# Patient Record
Sex: MALE | Race: WHITE | NOT HISPANIC OR LATINO | Employment: FULL TIME | URBAN - METROPOLITAN AREA
[De-identification: names, ages, dates, MRNs, and addresses within clinical notes are randomized per-mention and may not be internally consistent; named-entity substitution may affect disease eponyms.]

---

## 2024-01-08 ENCOUNTER — TELEPHONE (OUTPATIENT)
Dept: GASTROENTEROLOGY | Facility: CLINIC | Age: 59
End: 2024-01-08
Payer: COMMERCIAL

## 2024-01-08 VITALS — HEIGHT: 70 IN | WEIGHT: 210 LBS | BODY MASS INDEX: 30.06 KG/M2

## 2024-01-08 DIAGNOSIS — Z12.11 SCREENING FOR COLON CANCER: ICD-10-CM

## 2024-01-08 DIAGNOSIS — Z80.0 FAMILY HISTORY OF COLON CANCER: ICD-10-CM

## 2024-01-08 DIAGNOSIS — Z86.010 HISTORY OF COLON POLYPS: Primary | ICD-10-CM

## 2024-01-08 RX ORDER — MONTELUKAST SODIUM 10 MG/1
10 TABLET ORAL
COMMUNITY
Start: 2023-11-13

## 2024-01-08 RX ORDER — LEVOTHYROXINE SODIUM 88 UG/1
88 TABLET ORAL
COMMUNITY
Start: 2023-11-06

## 2024-01-08 RX ORDER — AMLODIPINE BESYLATE 10 MG/1
10 TABLET ORAL
COMMUNITY
Start: 2023-12-15

## 2024-01-08 NOTE — TELEPHONE ENCOUNTER
----- Message from Lachelle Solo sent at 1/8/2024  1:29 PM CST -----  Contact: pt  Pt calling about letter for recheck for colonoscopy and he can be reached at [phone]     Thanks,

## 2024-01-08 NOTE — TELEPHONE ENCOUNTER
Patient's last colonoscopy was w/ TIFFANIE on 10/8/2018. Polyp (2 mm) in the transverse colon. (Polypectomy). Scattered stools throughout colon. Grade 1 internal hemorrhoids. Per [facility]'s report.    Patient denied having any current problems or issues. Denied any past surgeries besides the colonoscopy. Also denied having any hx of seizures, sleep apnea, or kidney disease. Fam hx of colon cancer in father. Chart was reviewed and updated with patient. Prep instructions were also reviewed and sent to patient's email at [email or web address].    Colonoscopy scheduled on Monday July 8, 2024 w/ FRANCIS at [facility]. - DMP

## 2024-01-12 RX ORDER — SOD SULF/POT CHLORIDE/MAG SULF 1.479 G
12 TABLET ORAL DAILY
Qty: 24 TABLET | Refills: 0 | Status: SHIPPED | OUTPATIENT
Start: 2024-01-12

## 2024-01-12 NOTE — TELEPHONE ENCOUNTER
Order signed.  Patient with eosinophilic esophagitis- diagnosed in 2007 by Dr. Whiting. If having any upper GI symptoms (trouble swallowing/reflux) would recommend eval for upper endoscopy.  KN

## 2024-07-02 ENCOUNTER — TELEPHONE (OUTPATIENT)
Dept: GASTROENTEROLOGY | Facility: CLINIC | Age: 59
End: 2024-07-02
Payer: COMMERCIAL

## 2024-07-02 VITALS — BODY MASS INDEX: 30.06 KG/M2 | HEIGHT: 70 IN | WEIGHT: 210 LBS

## 2024-07-02 DIAGNOSIS — Z12.11 SCREENING FOR COLON CANCER: ICD-10-CM

## 2024-07-02 DIAGNOSIS — Z86.010 HISTORY OF COLON POLYPS: Primary | ICD-10-CM

## 2024-07-02 DIAGNOSIS — Z80.0 FAMILY HISTORY OF COLON CANCER: ICD-10-CM

## 2024-07-02 NOTE — TELEPHONE ENCOUNTER
"[facility]  [address] [place] [address]  Phone: [phone]  Fax: [phone]    History & Physical         Provider: Dr. Juliann Terrell    Patient Name: Jose Angel HIGH (age):1965  58 y.o.           Gender: male   Phone: [phone]     Referring Physician: Kedar Sahu     Vital Signs:   Height - 5' 10"  Weight - 210 lb  BMI -  30.13    Plan: Colonoscopy @ [facility]    Encounter Diagnoses   Name Primary?    History of colon polyps Yes    Screening for colon cancer     Family history of colon cancer            History:      Past Medical History:   Diagnosis Date    BMI 30.13 2024    Eosinophilic esophagitis 2007    Hypertension     Personal history of colonic polyps     Thyroid disorder       Past Surgical History:   Procedure Laterality Date    COLONOSCOPY  10/08/2018      Medication List with Changes/Refills   Current Medications    AMLODIPINE (NORVASC) 10 MG TABLET    Take 10 mg by mouth.    LEVOTHYROXINE (SYNTHROID) 88 MCG TABLET    Take 88 mcg by mouth.    MONTELUKAST (SINGULAIR) 10 MG TABLET    Take 10 mg by mouth.    SOD SULF-POT CHLORIDE-MAG SULF (SUTAB) 1.479-0.188- 0.225 GRAM TABLET    Take 12 tablets by mouth once daily. Take according to package instructions with indicated amount of water. No breakfast day before test. May substitute with Suprep, Clenpiq, Plenvu, Moviprep or GoLytely based on Rx plan and patient preference.      Review of patient's allergies indicates:  No Known Allergies   Family History   Problem Relation Name Age of Onset    No Known Problems Mother      Colon cancer Father  60      Social History     Tobacco Use    Smoking status: Never    Smokeless tobacco: Never   Substance Use Topics    Alcohol use: Yes     Comment: occ    Drug use: Never        Physical Examination:     General Appearance:___________________________  HEENT: " _____________________________________  Abdomen:____________________________________  Heart:________________________________________  Lungs:_______________________________________  Extremities:___________________________________  Skin:_________________________________________  Endocrine:____________________________________  Genitourinary:_________________________________  Neurological:__________________________________      Patient has been evaluated immediately prior to sedation and is medically cleared for endoscopy with IVCS as an ASA class: ______      Physician Signature: _________________________       Date: ________  Time: ________

## 2024-07-02 NOTE — TELEPHONE ENCOUNTER
Called pt and left a detailed message that I was calling as a courtesy regarding up coming Colon with NBP on 7/8/24, Monday and wanted to verify that he has his paper prep instructions and meds. I also mentioned that [facility] will call the day before (FRI) with the arrival time, GI Lab is located on the third floor, and to pre-register any time this week. abe

## 2024-07-08 ENCOUNTER — OUTSIDE PLACE OF SERVICE (OUTPATIENT)
Dept: GASTROENTEROLOGY | Facility: CLINIC | Age: 59
End: 2024-07-08

## 2024-07-08 LAB — CRC RECOMMENDATION EXT: NORMAL

## 2024-07-08 PROCEDURE — 45385 COLONOSCOPY W/LESION REMOVAL: CPT | Mod: 33,,, | Performed by: INTERNAL MEDICINE

## 2024-07-14 ENCOUNTER — TELEPHONE (OUTPATIENT)
Dept: GASTROENTEROLOGY | Facility: CLINIC | Age: 59
End: 2024-07-14
Payer: COMMERCIAL

## 2024-07-14 NOTE — TELEPHONE ENCOUNTER
7 TA, 1 hyp, repeat colonoscopy in 3 years.     Notify patient. Confirm recall tab in appointment desk is up-to-date (update if needed).  NBP

## 2024-07-22 ENCOUNTER — DOCUMENTATION ONLY (OUTPATIENT)
Dept: GASTROENTEROLOGY | Facility: CLINIC | Age: 59
End: 2024-07-22
Payer: COMMERCIAL